# Patient Record
Sex: FEMALE | Race: BLACK OR AFRICAN AMERICAN | NOT HISPANIC OR LATINO | URBAN - METROPOLITAN AREA
[De-identification: names, ages, dates, MRNs, and addresses within clinical notes are randomized per-mention and may not be internally consistent; named-entity substitution may affect disease eponyms.]

---
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
## 2022-01-05 PROCEDURE — 90834 PSYTX W PT 45 MINUTES: CPT | Mod: 95

## 2022-01-12 PROCEDURE — 90832 PSYTX W PT 30 MINUTES: CPT | Mod: 95

## 2022-02-23 PROBLEM — Z00.00 ENCOUNTER FOR PREVENTIVE HEALTH EXAMINATION: Status: ACTIVE | Noted: 2022-02-23

## 2022-03-03 PROCEDURE — 90834 PSYTX W PT 45 MINUTES: CPT | Mod: 95

## 2022-03-07 PROCEDURE — 99214 OFFICE O/P EST MOD 30 MIN: CPT | Mod: 95

## 2022-04-24 ENCOUNTER — TRANSCRIPTION ENCOUNTER (OUTPATIENT)
Age: 22
End: 2022-04-24

## 2022-04-28 PROCEDURE — 99214 OFFICE O/P EST MOD 30 MIN: CPT | Mod: 95

## 2022-07-17 NOTE — ED BEHAVIORAL HEALTH ASSESSMENT NOTE - MARITAL STATUS
Single
Go for blood tests as directed. Your doctor will do lab tests at regular visits to monitor the effects of this medicine. Please follow up with your doctor and keep your health care provider appointments.

## 2022-08-09 PROCEDURE — 99214 OFFICE O/P EST MOD 30 MIN: CPT | Mod: 95

## 2022-10-21 PROCEDURE — 99214 OFFICE O/P EST MOD 30 MIN: CPT | Mod: 95

## 2022-11-02 ENCOUNTER — APPOINTMENT (OUTPATIENT)
Dept: ORTHOPEDIC SURGERY | Facility: CLINIC | Age: 22
End: 2022-11-02

## 2022-11-02 VITALS — WEIGHT: 130 LBS | HEIGHT: 64 IN | BODY MASS INDEX: 22.2 KG/M2

## 2022-11-02 DIAGNOSIS — Z78.9 OTHER SPECIFIED HEALTH STATUS: ICD-10-CM

## 2022-11-02 DIAGNOSIS — M79.18 MYALGIA, OTHER SITE: ICD-10-CM

## 2022-11-02 PROBLEM — F32.9 MAJOR DEPRESSIVE DISORDER, SINGLE EPISODE, UNSPECIFIED: Chronic | Status: ACTIVE | Noted: 2020-05-06

## 2022-11-02 PROCEDURE — 73564 X-RAY EXAM KNEE 4 OR MORE: CPT | Mod: RT

## 2022-11-02 PROCEDURE — 99204 OFFICE O/P NEW MOD 45 MIN: CPT

## 2022-11-02 RX ORDER — NAPROXEN 500 MG/1
500 TABLET ORAL TWICE DAILY
Qty: 60 | Refills: 0 | Status: ACTIVE | COMMUNITY
Start: 2022-11-02 | End: 1900-01-01

## 2022-11-02 NOTE — IMAGING
[de-identified] : RIGHT KNEE\par Inspection:  moderate effusion\par Palpation: med facet of patella tenderness, medial retinacular tenderness\par Knee Range of Motion:  5-120\par Strength: 5/5 Quadriceps strength, 5/5 Hamstring strength\par Neurological: light touch is intact throughout\par Ligament Stability and Special Tests: \par McMurrays: neg\par Lachman: neg\par Pivot Shift: neg\par Posterior Drawer: neg\par Valgus: neg\par Varus: neg\par Patella Apprehension: positive\par Patella Maltracking: positive\par

## 2022-11-02 NOTE — HISTORY OF PRESENT ILLNESS
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

## 2022-11-02 NOTE — ASSESSMENT
[FreeTextEntry1] : Right X-Ray Examination of the KNEE (4 views): there are no fractures, subluxations or dislocations.\par \par Due to the patients instability event and mechanical symptoms along with pain, effusion, and pos patella apprehension and maltracking on exam we will get an mri to eval for loose bodies or chondral defects\par \par - The patient was advised to apply ice (wrapped in a towel or protective covering) to the area daily (20 minutes at a time, 2-4X/day).\par - PT for quad/VMO strengthening and progress to dynamic core and glut exercises\par - The patient was advised to modify their activities.\par - Patella stablization brace \par - naprosyn \par - Patient was given a prescription for an anti-inflammatory medication.  They will take it for the next week and then on an as needed basis, as long as there are no medical contra-indications.  Patient is counseled on possible GI, renal, and cardiovascular side effects.\par - f/u after mri\par \par .\par \par \par

## 2022-11-08 ENCOUNTER — FORM ENCOUNTER (OUTPATIENT)
Age: 22
End: 2022-11-08

## 2022-11-09 ENCOUNTER — APPOINTMENT (OUTPATIENT)
Dept: MRI IMAGING | Facility: CLINIC | Age: 22
End: 2022-11-09

## 2022-11-09 PROCEDURE — 73721 MRI JNT OF LWR EXTRE W/O DYE: CPT | Mod: RT

## 2022-11-18 PROCEDURE — 99214 OFFICE O/P EST MOD 30 MIN: CPT | Mod: 95

## 2022-11-23 ENCOUNTER — APPOINTMENT (OUTPATIENT)
Dept: ORTHOPEDIC SURGERY | Facility: CLINIC | Age: 22
End: 2022-11-23

## 2022-11-23 PROCEDURE — 99214 OFFICE O/P EST MOD 30 MIN: CPT

## 2022-11-23 NOTE — HISTORY OF PRESENT ILLNESS
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

## 2022-11-23 NOTE — ASSESSMENT
[FreeTextEntry1] : mri right knee 11/9/22 - evid lat patella dislocaiton event with BB, synov, eff, mpfl sprain, TT-TG 12mm\par \par - We discussed their diagnosis and treatment options at length including the risks and benefits of both surgical and non-surgical options.\par - We will continue conservative treatment with icing, anti-inflammatory medication, and PT \par - PT for quad/VMO strengthening and progress to dynamic core and glut exercises\par - Patella stablization brace \par - We discussed that RTP can happen when full ROM, no effusion, no pain, full strength (this may take up to 3-4 months after the instability episode)\par - If they do not make an appropriate improvement with conservative treatment we discussed the possibility of surgical intervention in the future.\par - Follow up in 6 weeks to re-evaluate, if not better we discussed r/b/a R knee open MPFLR, LR\par \par \par \par \par

## 2022-11-23 NOTE — IMAGING
[de-identified] : RIGHT KNEE\par Inspection:  moderate effusion\par Palpation: med facet of patella tenderness, medial retinacular tenderness\par Knee Range of Motion:  0-120\par Strength: 5/5 Quadriceps strength, 5/5 Hamstring strength\par Neurological: light touch is intact throughout\par Ligament Stability and Special Tests: \par McMurrays: neg\par Lachman: neg\par Pivot Shift: neg\par Posterior Drawer: neg\par Valgus: neg\par Varus: neg\par Patella Apprehension: positive\par Patella Maltracking: positive\par

## 2022-11-23 NOTE — DISCUSSION/SUMMARY
[de-identified] : The patient was advised of the diagnosis.  The natural history of the pathology was explained in full to the patient in layman's terms.  Several different treatment options were discussed and explained to the patient including the risks and benefits of both surgical and non-surgical treatments.\par \par The risks, benefits, and alternatives to right knee open MPFL reconstruction along with lateral release were reviewed with the patient. \par  \par The risks of surgery were outlined in full to the patient including but not limited to pain, infection (superficial or deep), bleeding, vascular injury, numbness, tingling, nerve damage (direct or indirect), scarring, non-healing, symptomatic implants, wound breakdown, failure to resolve symptoms, symptom recurrence, the need for further surgery, as well as medical complications such as blood clots, pulmonary embolism, heart attack, stroke, and other anesthesia complications including even death.  The patient understood and accepted these risks and that other complications not mentioned above could occur.    \par \par The intraoperative plan, post-operative plan, post-operative expectations and limitations were explained in full.  The importance of postoperative rehabilitation and restriction compliance was emphasized.  Expectations from non-surgical treatment were explained in full as well.  The patient demonstrated a complete understanding of the treatment and alternatives.\par

## 2022-11-29 ENCOUNTER — NON-APPOINTMENT (OUTPATIENT)
Age: 22
End: 2022-11-29

## 2022-11-30 ENCOUNTER — APPOINTMENT (OUTPATIENT)
Dept: ORTHOPEDIC SURGERY | Facility: CLINIC | Age: 22
End: 2022-11-30

## 2022-12-15 NOTE — ED PROVIDER NOTE - INTERNATIONAL TRAVEL
Patient attended Phase 2 Cardiac Rehab Exercise Session. Further documentation will be scanned into the medical record upon discharge.  
No

## 2022-12-29 ENCOUNTER — APPOINTMENT (OUTPATIENT)
Dept: ORTHOPEDIC SURGERY | Facility: CLINIC | Age: 22
End: 2022-12-29

## 2023-01-23 ENCOUNTER — APPOINTMENT (OUTPATIENT)
Dept: ORTHOPEDIC SURGERY | Facility: CLINIC | Age: 23
End: 2023-01-23
Payer: COMMERCIAL

## 2023-01-23 DIAGNOSIS — S83.004A UNSPECIFIED DISLOCATION OF RIGHT PATELLA, INITIAL ENCOUNTER: ICD-10-CM

## 2023-01-23 DIAGNOSIS — M25.361 OTHER INSTABILITY, RIGHT KNEE: ICD-10-CM

## 2023-01-23 DIAGNOSIS — M22.8X1 OTHER DISORDERS OF PATELLA, RIGHT KNEE: ICD-10-CM

## 2023-01-23 PROCEDURE — 99214 OFFICE O/P EST MOD 30 MIN: CPT

## 2023-01-23 RX ORDER — NAPROXEN 500 MG/1
500 TABLET ORAL TWICE DAILY
Qty: 60 | Refills: 0 | Status: ACTIVE | COMMUNITY
Start: 2023-01-23 | End: 1900-01-01

## 2023-01-23 NOTE — ASSESSMENT
[FreeTextEntry1] : mri right knee 11/9/22 - evid lat patella dislocaiton event with BB, synov, eff, mpfl sprain, TT-TG 12mm\par \par - We discussed their diagnosis and treatment options at length including the risks and benefits of both surgical and non-surgical options.\par - PT for quad/VMO strengthening and progress to dynamic core and glut exercises\par - Patella stablization brace with activtiy\par - We discussed that RTP can happen when full ROM, no effusion, no pain, full strength (this may take up to 3-4 months after the instability episode)\par - If they do not make an appropriate improvement with conservative treatment we discussed the possibility of surgical intervention in the future.\par - napryn rx\par - Patient was given a prescription for an anti-inflammatory medication.  They will take it for the next week and then on an as needed basis, as long as there are no medical contra-indications.  Patient is counseled on possible GI, renal, and cardiovascular side effects.\par - Follow up to re-eval and further discuss surgery when can takre off of work,  we discussed r/b/a R knee open MPFLR, LR but right now is not the best time in her life so we will do when she can\par \par \par \par \par \par

## 2023-01-23 NOTE — HISTORY OF PRESENT ILLNESS
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  Feels she needs surgery since patella still subluxes but doesn’t have the time to do it

## 2023-01-23 NOTE — IMAGING
[de-identified] : RIGHT KNEE\par Inspection:  moderate effusion\par Palpation: med facet of patella tenderness, medial retinacular tenderness\par Knee Range of Motion:  0-120\par Strength: 5/5 Quadriceps strength, 5/5 Hamstring strength\par Neurological: light touch is intact throughout\par Ligament Stability and Special Tests: \par McMurrays: neg\par Lachman: neg\par Pivot Shift: neg\par Posterior Drawer: neg\par Valgus: neg\par Varus: neg\par Patella Apprehension: positive\par Patella Maltracking: positive\par

## 2023-01-23 NOTE — DISCUSSION/SUMMARY
[de-identified] : The patient was advised of the diagnosis.  The natural history of the pathology was explained in full to the patient in layman's terms.  Several different treatment options were discussed and explained to the patient including the risks and benefits of both surgical and non-surgical treatments.\par \par The risks, benefits, and alternatives to right knee open MPFL reconstruction along with lateral release were reviewed with the patient. \par  \par The risks of surgery were outlined in full to the patient including but not limited to pain, infection (superficial or deep), bleeding, vascular injury, numbness, tingling, nerve damage (direct or indirect), scarring, non-healing, symptomatic implants, wound breakdown, failure to resolve symptoms, symptom recurrence, the need for further surgery, as well as medical complications such as blood clots, pulmonary embolism, heart attack, stroke, and other anesthesia complications including even death.  The patient understood and accepted these risks and that other complications not mentioned above could occur.    \par \par The intraoperative plan, post-operative plan, post-operative expectations and limitations were explained in full.  The importance of postoperative rehabilitation and restriction compliance was emphasized.  Expectations from non-surgical treatment were explained in full as well.  The patient demonstrated a complete understanding of the treatment and alternatives.\par

## 2023-01-26 ENCOUNTER — NON-APPOINTMENT (OUTPATIENT)
Age: 23
End: 2023-01-26

## 2023-01-27 ENCOUNTER — EMERGENCY (EMERGENCY)
Facility: HOSPITAL | Age: 23
LOS: 0 days | Discharge: HOME | End: 2023-01-27
Attending: EMERGENCY MEDICINE
Payer: COMMERCIAL

## 2023-01-27 VITALS
RESPIRATION RATE: 18 BRPM | OXYGEN SATURATION: 100 % | SYSTOLIC BLOOD PRESSURE: 103 MMHG | DIASTOLIC BLOOD PRESSURE: 66 MMHG | HEART RATE: 83 BPM | TEMPERATURE: 98 F

## 2023-01-27 VITALS
OXYGEN SATURATION: 98 % | HEART RATE: 118 BPM | WEIGHT: 132.06 LBS | RESPIRATION RATE: 18 BRPM | HEIGHT: 64 IN | TEMPERATURE: 98 F | SYSTOLIC BLOOD PRESSURE: 90 MMHG | DIASTOLIC BLOOD PRESSURE: 57 MMHG

## 2023-01-27 DIAGNOSIS — F32.A DEPRESSION, UNSPECIFIED: ICD-10-CM

## 2023-01-27 DIAGNOSIS — F19.19 OTHER PSYCHOACTIVE SUBSTANCE ABUSE WITH UNSPECIFIED PSYCHOACTIVE SUBSTANCE-INDUCED DISORDER: ICD-10-CM

## 2023-01-27 DIAGNOSIS — R00.0 TACHYCARDIA, UNSPECIFIED: ICD-10-CM

## 2023-01-27 PROCEDURE — 93010 ELECTROCARDIOGRAM REPORT: CPT

## 2023-01-27 PROCEDURE — 99284 EMERGENCY DEPT VISIT MOD MDM: CPT

## 2023-01-27 NOTE — ED PROVIDER NOTE - PHYSICAL EXAMINATION
Clinic Care Coordination Contact  [facility]/Voicemail       Clinical Data: Care Coordinator Outreach  Outreach attempted x 1.  Unable to reach.    Plan: Care Coordinator will try to reach patient again in 1-2 business days.        KHALIF Machuca  [phone]  [facility]     Gen: Alert, NAD  Head: NC, AT   Eyes: PERRL, EOMI, normal lids/conjunctiva  ENT: normal hearing, patent oropharynx without erythema/exudate, uvula midline  Neck: supple, no tenderness, Trachea midline  Pulm: Bilateral BS, normal resp effort, no wheeze/stridor/retractions  CV: RRR, no M/R/G, 2+ radial and dp pulses bl, no edema  Abd: soft, NT/ND, +BS, no hepatosplenomegaly  Mskel: extremities x4 with normal ROM and no joint effusions. no ctl spine ttp.   Skin: no rash, no bruising   Neuro: AAOx3, no sensory/motor deficits, CN 2-12 intact

## 2023-01-27 NOTE — ED ADULT NURSE NOTE - MODE OF DISCHARGE
Transitional Planning:    ZAYNAB FAUSTIN for progress note and pink slip stating pt is medically stable for [facility] transport. CM had spoke with female resident earlier re: this but there is nothing in the chart. 14:35  CM called [facility] access re: bed availability. They cannot start process until we have a neg COVID test.  Results not back yet. 16:25  [facility] with Alyse De La Cruz. COIVD is neg. Alyse De La Cruz states that pt has been accepted and once they get the voluntary form faxed they can let us know when bed is available. Dayanara Ibrahim RN faxed form. CM called [facility] and sent over paper work. Pt is will call for transport. Transport info faxed to [facility].    18:20 Transport set for [facility] as pt is cleared for [facility]. Ambulatory

## 2023-01-27 NOTE — ED PROVIDER NOTE - NSFOLLOWUPINSTRUCTIONS_ED_ALL_ED_FT
Call the neurology doctor for a follow up appointment.     Avoid using marijuana in any form.     Use trazodone carefully and only as needed.

## 2023-01-27 NOTE — ED PROVIDER NOTE - CARE PROVIDER_API CALL
Heron Samaniego)  Clinical Neurophysiology; Neurology  [address], [address]  [place] [address]  Phone: [phone]  Fax: [phone]  Follow Up Time: 4-6 Days    Taylor Hidalgo  NEUROLOGY  [address] [place] [address]  Phone: [phone]  Fax: [phone]  Follow Up Time: 4-6 Days   Heron Samaniego)  Clinical Neurophysiology; Neurology  [address], [address]  [place] [address]  Phone: [phone]  Fax: [phone]  Follow Up Time: 4-6 Days    Taylor Hidalgo  NEUROLOGY  [address] [place] [address]  Phone: [phone]  Fax: [phone]  Follow Up Time: 4-6 Days   Heron Samaniego)  Clinical Neurophysiology; Neurology  [address], [address]  [place] [address]  Phone: [phone]  Fax: [phone]  Follow Up Time: 4-6 Days    Taylor Hidalgo  NEUROLOGY  [address] [place] [address]  Phone: [phone]  Fax: [phone]  Follow Up Time: 4-6 Days

## 2023-01-27 NOTE — ED ADULT TRIAGE NOTE - CHIEF COMPLAINT QUOTE
pt states, she smoked marijuana today, after smoking she vomited and became confused." at triage pt is AOx4. pt c/o dizziness and lightheaded. pt states, she smoked marijuana today, after smoking she vomited and became confused." at triage pt is AOx4. pt c/o dizziness and lightheaded. bp is 90/57, .

## 2023-01-27 NOTE — ED PROVIDER NOTE - NPI NUMBER (FOR SYSADMIN USE ONLY) :
Patient had  19.   Should patient make an appt with PCP for this issue?  Sent to Dr. Nielsen     [[id number]],[[id number]] [[id number]] [[id number]],[[id number]]

## 2023-01-27 NOTE — ED PROVIDER NOTE - PATIENT PORTAL LINK FT
You can access the FollowMyHealth Patient Portal offered by [facility] by registering at the following website: [email or web address]. By joining [employer]’s FollowMyHealth portal, you will also be able to view your health information using other applications (apps) compatible with our system. You can access the FollowMyHealth Patient Portal offered by [facility] by registering at the following website: [email or web address]. By joining [employer]’s FollowMyHealth portal, you will also be able to view your health information using other applications (apps) compatible with our system. You can access the FollowMyHealth Patient Portal offered by [facility] by registering at the following website: [email or web address]. By joining [employer]’s FollowMyHealth portal, you will also be able to view your health information using other applications (apps) compatible with our system.

## 2023-01-27 NOTE — ED ADULT NURSE NOTE - NSIMPLEMENTINTERV_GEN_ALL_ED
Implemented All Universal Safety Interventions:  [place] to call system. Call bell, personal items and telephone within reach. Instruct patient to call for assistance. Room bathroom lighting operational. Non-slip footwear when patient is off stretcher. Physically safe environment: no spills, clutter or unnecessary equipment. Stretcher in lowest position, wheels locked, appropriate side rails in place. Implemented All Universal Safety Interventions:  [place] to call system. Call bell, personal items and telephone within reach. Instruct patient to call for assistance. Room bathroom lighting operational. Non-slip footwear when patient is off stretcher. Physically safe environment: no spills, clutter or unnecessary equipment. Stretcher in lowest position, wheels locked, appropriate side rails in place. Implemented All Universal Safety Interventions:  [place] to call system. Call bell, personal items and telephone within reach. Instruct patient to call for assistance. Room bathroom lighting operational. Non-slip footwear when patient is off stretcher. Physically safe environment: no spills, clutter or unnecessary equipment. Stretcher in lowest position, wheels locked, appropriate side rails in place.

## 2023-01-27 NOTE — ED PROVIDER NOTE - CLINICAL SUMMARY MEDICAL DECISION MAKING FREE TEXT BOX
thc use, likely contributing to "black out" episode, which sounds more like thc intoxication or over sedation rather than syncope. pt counseled to avoid using thc and to take trazodone carefully. pt should see neuro to rule out epileptiform activity    I read ekg as sinus tach rate 112, no st elevation or depression, qtc 444, narrow qrs, normal axis.

## 2023-01-27 NOTE — ED PROVIDER NOTE - PROVIDER TOKENS
PROVIDER:[TOKEN:[[id number]],FOLLOWUP:[4-6 Days]],PROVIDER:[TOKEN:[[id number]],FOLLOWUP:[4-6 Days]] PROVIDER:[TOKEN:[[id number]],FOLLOWUP:[4-6 Days]],PROVIDER:[TOKEN:[[id number]],FOLLOWUP:[4-6 Days]] PROVIDER:[TOKEN:[[id number]],FOLLOWUP:[4-6 Days]],PROVIDER:[TOKEN:[[id number]],FOLLOWUP:[4-6 Days]]

## 2023-01-27 NOTE — ED PROVIDER NOTE - CARE PROVIDERS DIRECT ADDRESSES
,[email or web address],[email or web address] ,[email or web address],DirectAddress_Unknown ,[email or web address],DirectAddress_Unknown

## 2023-01-27 NOTE — ED ADULT NURSE NOTE - CHIEF COMPLAINT QUOTE
pt states, she smoked marijuana today, after smoking she vomited and became confused." at triage pt is AOx4. pt c/o dizziness and lightheaded. bp is 90/57, .

## 2023-01-27 NOTE — ED ADULT NURSE NOTE - OBJECTIVE STATEMENT
Covering for primary RN Pratibha.   Received pt in the ED from triage. AOx4. C/o dizziness, lightheaded. Pt endorsed she "blacked out" at a friend's house after smoking marijuana, also she felt she could not move her arms and legs. Pt endorsed she had similar episode in the past. Pt endorsed the symptoms sometimes correspondents smoking or trazodone taking.  Pt denied any fall/trauma/injury, cp, sob, n/v/d, SI/HI, hallucinations. Speaks full sentences, unlabored breathing on RA. Able to TORRES. PMH of depression. Home meds on lexapro and trazodone .

## 2024-02-02 ENCOUNTER — NON-APPOINTMENT (OUTPATIENT)
Age: 24
End: 2024-02-02

## 2024-07-31 PROBLEM — F32.9 MAJOR DEPRESSIVE DISORDER, SINGLE EPISODE, UNSPECIFIED: Chronic | Status: ACTIVE | Noted: 2023-01-27

## 2024-08-05 ENCOUNTER — APPOINTMENT (OUTPATIENT)
Dept: ORTHOPEDIC SURGERY | Facility: CLINIC | Age: 24
End: 2024-08-05

## 2024-08-05 PROCEDURE — 99214 OFFICE O/P EST MOD 30 MIN: CPT

## 2024-08-05 PROCEDURE — 73564 X-RAY EXAM KNEE 4 OR MORE: CPT | Mod: RT

## 2024-08-05 NOTE — DISCUSSION/SUMMARY
[de-identified] : The patient was advised of the diagnosis.  The natural history of the pathology was explained in full to the patient in layman's terms.  Several different treatment options were discussed and explained to the patient including the risks and benefits of both surgical and non-surgical treatments.  The risks, benefits, and alternatives to right knee open MPFL reconstruction along with VMO advancement and lateral release were reviewed with the patient.    The risks of surgery were outlined in full to the patient including but not limited to pain, infection (superficial or deep), bleeding, vascular injury, numbness, tingling, nerve damage (direct or indirect), scarring, non-healing, symptomatic implants, wound breakdown, failure to resolve symptoms, symptom recurrence, the need for further surgery, as well as medical complications such as blood clots, pulmonary embolism, heart attack, stroke, and other anesthesia complications including even death.  The patient understood and accepted these risks and that other complications not mentioned above could occur.      The intraoperative plan, post-operative plan, post-operative expectations and limitations were explained in full.  The importance of postoperative rehabilitation and restriction compliance was emphasized.  Expectations from non-surgical treatment were explained in full as well.  The patient demonstrated a complete understanding of the treatment and alternatives.

## 2024-08-05 NOTE — HISTORY OF PRESENT ILLNESS
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 Has tried ice, naproxen  Had prior instability event in past tx nonop PT/brace  11/23/22 - had mri showing patella dilocaiton event, no LB, using ice, brace and mod activiyt, cont sense of instability 1/23/23- Has been doing HEP and PT in [place]  without relief.  Feels she needs surgery since patella still subluxes but doesn't have the time to do it  8/5/24- felt patella sublux again mid july and worsening since, has a couple more instability events since jan but not as bad as this one

## 2024-08-05 NOTE — ASSESSMENT
[FreeTextEntry1] : mri right knee 11/9/22 - evid lat patella dislocaiton event with BB, synov, eff, mpfl sprain, TT-TG 12mm    - The patient was advised of the diagnosis.  The natural history of the pathology was explained to the patient in layman's terms.  Several different treatment options were discussed and explained including the risks and benefits of both surgical and non-surgical treatments.   Surgical risks include but are not limited to pain, infection, bleeding, vascular injury, numbness, tingling, nerve damage. - since not improving with conserv tx we discussed she is canddiate for surgery  - r/b/a R knee open MPFLR, VMO advancement, LR discused at length -  right now is not the best time in her life so we will do when she can - cont PT for quad/VMO strengthening and progress to dynamic core and glut exercises - The patient is to continue home exercises learned at physical therapy. - mri to eval for LB since new dislocation event - Follow up to re-eval and further discuss surgery if she can take off work , she says its hard and takes care of her grandma is why she hasnt had it yet

## 2024-08-05 NOTE — IMAGING
[de-identified] :  RIGHT KNEE Inspection:  moderate effusion Palpation: med facet of patella tenderness, medial retinacular tenderness Knee Range of Motion:  0-130 Strength: 5/5 Quadriceps strength, 5/5 Hamstring strength Neurological: light touch is intact throughout Ligament Stability and Special Tests:  McMurrays: neg Lachman: neg Pivot Shift: neg Posterior Drawer: neg Valgus: neg Varus: neg Patella Apprehension: positive Patella Maltracking: positive

## 2024-08-08 ENCOUNTER — APPOINTMENT (OUTPATIENT)
Dept: MRI IMAGING | Facility: CLINIC | Age: 24
End: 2024-08-08

## 2024-08-08 PROCEDURE — 73721 MRI JNT OF LWR EXTRE W/O DYE: CPT | Mod: RT

## 2024-08-14 NOTE — DISCUSSION/SUMMARY
[de-identified] : The patient was advised of the diagnosis.  The natural history of the pathology was explained in full to the patient in layman's terms.  Several different treatment options were discussed and explained to the patient including the risks and benefits of both surgical and non-surgical treatments.  The risks, benefits, and alternatives to right knee open MPFL reconstruction along with VMO advancement and lateral release were reviewed with the patient.    The risks of surgery were outlined in full to the patient including but not limited to pain, infection (superficial or deep), bleeding, vascular injury, numbness, tingling, nerve damage (direct or indirect), scarring, non-healing, symptomatic implants, wound breakdown, failure to resolve symptoms, symptom recurrence, the need for further surgery, as well as medical complications such as blood clots, pulmonary embolism, heart attack, stroke, and other anesthesia complications including even death.  The patient understood and accepted these risks and that other complications not mentioned above could occur.      The intraoperative plan, post-operative plan, post-operative expectations and limitations were explained in full.  The importance of postoperative rehabilitation and restriction compliance was emphasized.  Expectations from non-surgical treatment were explained in full as well.  The patient demonstrated a complete understanding of the treatment and alternatives.

## 2024-08-14 NOTE — IMAGING
[de-identified] :  RIGHT KNEE Inspection:  moderate effusion Palpation: med facet of patella tenderness, medial retinacular tenderness Knee Range of Motion:  0-130 Strength: 5/5 Quadriceps strength, 5/5 Hamstring strength Neurological: light touch is intact throughout Ligament Stability and Special Tests:  McMurrays: neg Lachman: neg Pivot Shift: neg Posterior Drawer: neg Valgus: neg Varus: neg Patella Apprehension: positive Patella Maltracking: positive

## 2024-08-14 NOTE — HISTORY OF PRESENT ILLNESS
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 Has tried ice, naproxen  Had prior instability event in past tx nonop PT/brace  11/23/22 - had mri showing patella dilocaiton event, no LB, using ice, brace and mod activiyt, cont sense of instability 1/23/23- Has been doing HEP and PT in [place]  without relief.  Feels she needs surgery since patella still subluxes but doesn't have the time to do it  8/5/24- felt patella sublux again mid july and worsening since, has a couple more instability events since jan but not as bad as this one 8/15/24 - cont sense of instabilty, had updated mri cont with HEP learned at PT and icing and nsaids

## 2024-08-14 NOTE — ED ADULT NURSE NOTE - CHIEF COMPLAINT QUOTE
Pt arrives with staff from [facility] c/o right knee pain after doing lunges. Also c/o difficulty ambulating. Appears in no apparent distress. MD Osullivan for peds eval. Pt cleared for peds per MD Osullivan. Well I guess this is relatively good news.    If she was taking 1 pill Colestipol twice daily, she should start again 1 pill/day or maybe 1 pill every other day and watch closely for how she does.  She may only need to take 3-5 pills/week if she is that sensitive.    Glad that she took some MiraLAX/Dulcolax.    If she is willing, I would recommend continuing on the Metamucil psyllium indefinitely.  Probably can stop the stool softeners once the constipation lets up.    - cb

## 2024-08-14 NOTE — ASSESSMENT
[FreeTextEntry1] : mri right knee 11/9/22 - evid lat patella dislocaiton event with BB, synov, eff, mpfl sprain, TT-TG 12mm mri right knee 8/8/24 - lat patella tilt, mpfl sprain, eff, synov, TT-TG 13mm    - The patient was advised of the diagnosis.  The natural history of the pathology was explained to the patient in layman's terms.  Several different treatment options were discussed and explained including the risks and benefits of both surgical and non-surgical treatments.   Surgical risks include but are not limited to pain, infection, bleeding, vascular injury, numbness, tingling, nerve damage. - since not improving with conserv tx we discussed she is canddiate for surgery  - r/b/a R knee open MPFLR, VMO advancement, LR discused at length -  right now is not the best time in her life so we will do when she can - cont PT for quad/VMO strengthening and progress to dynamic core and glut exercises - The patient is to continue home exercises learned at physical therapy. - mri to eval for LB since new dislocation event - Follow up to re-eval and further discuss surgery if she can take off work , she says its hard and takes care of her grandma is why she hasnt had it yet

## 2024-08-15 ENCOUNTER — APPOINTMENT (OUTPATIENT)
Dept: ORTHOPEDIC SURGERY | Facility: CLINIC | Age: 24
End: 2024-08-15

## 2024-08-15 DIAGNOSIS — M22.8X1 OTHER DISORDERS OF PATELLA, RIGHT KNEE: ICD-10-CM

## 2024-08-15 DIAGNOSIS — M25.361 OTHER INSTABILITY, RIGHT KNEE: ICD-10-CM

## 2024-08-15 DIAGNOSIS — S83.004A UNSPECIFIED DISLOCATION OF RIGHT PATELLA, INITIAL ENCOUNTER: ICD-10-CM

## 2024-08-26 NOTE — ED PEDIATRIC NURSE NOTE - CAS EDN INTEG ASSESS
In an effort to ensure that our patients [employer], a Team Member has reviewed your chart and identified an opportunity to provide the best care possible. An attempt was made to discuss or schedule due or overdue Preventive or Chronic Condition care.    The Outcome was Contact was not made, left message. We are attempting to schedule a mammogram appointment. If you have any questions or need help with scheduling, contact our Health Outreach Team at [phone]. Care Gaps identified: Breast Cancer Screening.    Appointment needed:  Mammo    Services coordinated include: None.   - - -

## 2024-11-06 ENCOUNTER — EMERGENCY (EMERGENCY)
Facility: HOSPITAL | Age: 24
LOS: 1 days | Discharge: HOME | End: 2024-11-06
Attending: STUDENT IN AN ORGANIZED HEALTH CARE EDUCATION/TRAINING PROGRAM
Payer: COMMERCIAL

## 2024-11-06 VITALS
WEIGHT: 139.99 LBS | DIASTOLIC BLOOD PRESSURE: 80 MMHG | OXYGEN SATURATION: 97 % | HEART RATE: 97 BPM | RESPIRATION RATE: 19 BRPM | SYSTOLIC BLOOD PRESSURE: 125 MMHG | TEMPERATURE: 98 F | HEIGHT: 64 IN

## 2024-11-06 VITALS
TEMPERATURE: 99 F | OXYGEN SATURATION: 100 % | DIASTOLIC BLOOD PRESSURE: 74 MMHG | SYSTOLIC BLOOD PRESSURE: 115 MMHG | HEART RATE: 78 BPM | RESPIRATION RATE: 16 BRPM

## 2024-11-06 LAB
ALBUMIN SERPL ELPH-MCNC: 4.4 G/DL — SIGNIFICANT CHANGE UP (ref 3.3–5)
ALP SERPL-CCNC: 58 U/L — SIGNIFICANT CHANGE UP (ref 40–120)
ALT FLD-CCNC: 9 U/L — LOW (ref 10–45)
ANION GAP SERPL CALC-SCNC: 10 MMOL/L — SIGNIFICANT CHANGE UP (ref 5–17)
AST SERPL-CCNC: 15 U/L — SIGNIFICANT CHANGE UP (ref 10–40)
BASOPHILS # BLD AUTO: 0.06 K/UL — SIGNIFICANT CHANGE UP (ref 0–0.2)
BASOPHILS NFR BLD AUTO: 0.6 % — SIGNIFICANT CHANGE UP (ref 0–2)
BILIRUB SERPL-MCNC: 0.5 MG/DL — SIGNIFICANT CHANGE UP (ref 0.2–1.2)
BUN SERPL-MCNC: 17 MG/DL — SIGNIFICANT CHANGE UP (ref 7–23)
CALCIUM SERPL-MCNC: 9.5 MG/DL — SIGNIFICANT CHANGE UP (ref 8.4–10.5)
CHLORIDE SERPL-SCNC: 105 MMOL/L — SIGNIFICANT CHANGE UP (ref 96–108)
CO2 SERPL-SCNC: 23 MMOL/L — SIGNIFICANT CHANGE UP (ref 22–31)
CREAT SERPL-MCNC: 0.81 MG/DL — SIGNIFICANT CHANGE UP (ref 0.5–1.3)
EGFR: 106 ML/MIN/1.73M2 — SIGNIFICANT CHANGE UP
EOSINOPHIL # BLD AUTO: 0.06 K/UL — SIGNIFICANT CHANGE UP (ref 0–0.5)
EOSINOPHIL NFR BLD AUTO: 0.6 % — SIGNIFICANT CHANGE UP (ref 0–6)
GLUCOSE SERPL-MCNC: 92 MG/DL — SIGNIFICANT CHANGE UP (ref 70–99)
HCG SERPL-ACNC: <2 MIU/ML — SIGNIFICANT CHANGE UP
HCT VFR BLD CALC: 35.5 % — SIGNIFICANT CHANGE UP (ref 34.5–45)
HGB BLD-MCNC: 12.4 G/DL — SIGNIFICANT CHANGE UP (ref 11.5–15.5)
IMM GRANULOCYTES NFR BLD AUTO: 0.3 % — SIGNIFICANT CHANGE UP (ref 0–0.9)
LIDOCAIN IGE QN: 37 U/L — SIGNIFICANT CHANGE UP (ref 7–60)
LYMPHOCYTES # BLD AUTO: 2.78 K/UL — SIGNIFICANT CHANGE UP (ref 1–3.3)
LYMPHOCYTES # BLD AUTO: 28.3 % — SIGNIFICANT CHANGE UP (ref 13–44)
MAGNESIUM SERPL-MCNC: 2 MG/DL — SIGNIFICANT CHANGE UP (ref 1.6–2.6)
MCHC RBC-ENTMCNC: 29.7 PG — SIGNIFICANT CHANGE UP (ref 27–34)
MCHC RBC-ENTMCNC: 34.9 G/DL — SIGNIFICANT CHANGE UP (ref 32–36)
MCV RBC AUTO: 84.9 FL — SIGNIFICANT CHANGE UP (ref 80–100)
MONOCYTES # BLD AUTO: 0.7 K/UL — SIGNIFICANT CHANGE UP (ref 0–0.9)
MONOCYTES NFR BLD AUTO: 7.1 % — SIGNIFICANT CHANGE UP (ref 2–14)
NEUTROPHILS # BLD AUTO: 6.2 K/UL — SIGNIFICANT CHANGE UP (ref 1.8–7.4)
NEUTROPHILS NFR BLD AUTO: 63.1 % — SIGNIFICANT CHANGE UP (ref 43–77)
NRBC # BLD: 0 /100 WBCS — SIGNIFICANT CHANGE UP (ref 0–0)
PLATELET # BLD AUTO: 410 K/UL — HIGH (ref 150–400)
POTASSIUM SERPL-MCNC: 4.1 MMOL/L — SIGNIFICANT CHANGE UP (ref 3.5–5.3)
POTASSIUM SERPL-SCNC: 4.1 MMOL/L — SIGNIFICANT CHANGE UP (ref 3.5–5.3)
PROT SERPL-MCNC: 7.4 G/DL — SIGNIFICANT CHANGE UP (ref 6–8.3)
RBC # BLD: 4.18 M/UL — SIGNIFICANT CHANGE UP (ref 3.8–5.2)
RBC # FLD: 14 % — SIGNIFICANT CHANGE UP (ref 10.3–14.5)
SODIUM SERPL-SCNC: 138 MMOL/L — SIGNIFICANT CHANGE UP (ref 135–145)
TROPONIN T, HIGH SENSITIVITY RESULT: <6 NG/L — SIGNIFICANT CHANGE UP (ref 0–51)
WBC # BLD: 9.83 K/UL — SIGNIFICANT CHANGE UP (ref 3.8–10.5)
WBC # FLD AUTO: 9.83 K/UL — SIGNIFICANT CHANGE UP (ref 3.8–10.5)

## 2024-11-06 PROCEDURE — 71046 X-RAY EXAM CHEST 2 VIEWS: CPT

## 2024-11-06 PROCEDURE — 93005 ELECTROCARDIOGRAM TRACING: CPT

## 2024-11-06 PROCEDURE — 99285 EMERGENCY DEPT VISIT HI MDM: CPT | Mod: 25

## 2024-11-06 PROCEDURE — 83690 ASSAY OF LIPASE: CPT

## 2024-11-06 PROCEDURE — 71046 X-RAY EXAM CHEST 2 VIEWS: CPT | Mod: 26

## 2024-11-06 PROCEDURE — 80053 COMPREHEN METABOLIC PANEL: CPT

## 2024-11-06 PROCEDURE — 99285 EMERGENCY DEPT VISIT HI MDM: CPT

## 2024-11-06 PROCEDURE — 84484 ASSAY OF TROPONIN QUANT: CPT

## 2024-11-06 PROCEDURE — 83735 ASSAY OF MAGNESIUM: CPT

## 2024-11-06 PROCEDURE — 85025 COMPLETE CBC W/AUTO DIFF WBC: CPT

## 2024-11-06 PROCEDURE — 84702 CHORIONIC GONADOTROPIN TEST: CPT

## 2024-11-06 PROCEDURE — 36000 PLACE NEEDLE IN VEIN: CPT

## 2024-11-06 RX ORDER — SODIUM CHLORIDE 9 MG/ML
1000 INJECTION, SOLUTION INTRAMUSCULAR; INTRAVENOUS; SUBCUTANEOUS ONCE
Refills: 0 | Status: COMPLETED | OUTPATIENT
Start: 2024-11-06 | End: 2024-11-06

## 2024-11-06 RX ADMIN — SODIUM CHLORIDE 2000 MILLILITER(S): 9 INJECTION, SOLUTION INTRAMUSCULAR; INTRAVENOUS; SUBCUTANEOUS at 13:50

## 2024-11-06 NOTE — ED ADULT NURSE NOTE - MODE OF DISCHARGE
Albendazole Pregnancy And Lactation Text: This medication is Pregnancy Category C and it isn't known if it is safe during pregnancy. It is also excreted in breast milk. Ambulatory

## 2024-11-06 NOTE — ED PROVIDER NOTE - CLINICAL SUMMARY MEDICAL DECISION MAKING FREE TEXT BOX
Attending Fareed: 22 y/o F who denies PMH presenting w/ chest pain. Reports yesterday while at work had episode of pain in her upper stomach radiating into chest. This caused her to feel dizzy, felt like her vision was going black so had to sit down. While sitting down, felt like she was paralyzed. Was aware of her surroundings, but could not move or speak. Episode was brief and self resolved. Had another episode later in the day. No loss of urinary continence. No tongue biting. No shaking episode. Denies prior hx of similar. No further episodes since. No known family hx of sudden cardiac death or cardiac disease at young age. Non smoker. Denies fevers, chills, headache, blurred vision,  cough, shortness of breath, n/v/d/c, urinary symptoms, MSK pain, rash. Pt overall well appearing. Description of episode sounds most consistent w/ vaso vagal episode. No other symptoms to suggest seizure. Likely brought on by episode of pain. Will eval for ACS. Eval for pancreatitis. No hypoxia/tachypnea/tachycardia to suggest PE. Will eval for anemia. Eval for arrhythmia. No family hx of sudden cardiac death. Eval for metabolic abnormalities. Plan for labs, imaging, EKG, IVF, meds PRN. Will reassess the need for additional interventions as clinically warranted. Refer to any progress notes for updates on clinical course and as a continuation of this MDM.     I, Dr. Nikko Guerrier, independently interpreted the EKG which showed NSR at a rate of 86.

## 2024-11-06 NOTE — ED PROVIDER NOTE - NSFOLLOWUPINSTRUCTIONS_ED_ALL_ED_FT
Syncope    Syncope is when you temporarily lose consciousness, also called fainting or passing out. It is caused by a sudden decrease in blood flow to the brain. Even though most causes of syncope are not dangerous, syncope can possibly be a sign of a serious medical problem. Signs that you may be about to faint include feeling dizzy, lightheaded, nausea, visual changes, or cold/clammy skin. Do not drive, operate heavy machinery, or play sports until your health care provider says it is okay.    Followup with the above cardiologist for a holter monitor and re-assessment.    Followup with the above neurologist for potential complex migraines    SEEK IMMEDIATE MEDICAL CARE IF YOU HAVE ANY OF THE FOLLOWING SYMPTOMS: severe headache, pain in your chest/abdomen/back, bleeding from your mouth or rectum, palpitations, shortness of breath, pain with breathing, seizure, confusion, or trouble walking.

## 2024-11-06 NOTE — ED PROVIDER NOTE - OBJECTIVE STATEMENT
Attending Fareed: 22 y/o F who denies PMH presenting w/ Attending Fareed: 22 y/o F who denies PMH presenting w/ chest pain. Reports yesterday while at work had episode of pain in her upper stomach radiating into chest. This caused her to feel dizzy, felt like her vision was going black so had to sit down. While sitting down, felt like she was paralyzed. Was aware of her surroundings, but could not move or speak. Episode was brief and self resolved. Had another episode later in the day. No loss of urinary continence. No tongue biting. No shaking episode. Denies prior hx of similar. No further episodes since. No known family hx of sudden cardiac death or cardiac disease at young age. Non smoker. Denies fevers, chills, headache, blurred vision,  cough, shortness of breath, n/v/d/c, urinary symptoms, MSK pain, rash.

## 2024-11-06 NOTE — ED PROVIDER NOTE - ATTENDING CONTRIBUTION TO CARE
Attending Fareed: I performed a history and physical exam of the patient and discussed their management with the resident/fellow/student. I have reviewed the resident/fellow/student note and agree with the documented findings and plan of care, except as noted. I have personally performed a substantive portion of the visit including all aspects of the medical decision making. My medical decision making and observations are found above. Please refer to any progress notes for updates on clinical course. My notes supersedes the above resident/fellow/student note in case of discrepancy

## 2024-11-06 NOTE — ED PROVIDER NOTE - PATIENT PORTAL LINK FT
You can access the FollowMyHealth Patient Portal offered by [facility] by registering at the following website: [email or web address]. By joining [facility]’s FollowMyHealth portal, you will also be able to view your health information using other applications (apps) compatible with our system.

## 2024-11-06 NOTE — ED ADULT NURSE NOTE - NSFALLHARMRISKINTERV_ED_ALL_ED

## 2024-11-06 NOTE — ED PROVIDER NOTE - PROGRESS NOTE DETAILS
Edouard Morrow MD:  Patient symptomatically improved, discuss followup with cardiology and neurology

## 2024-11-06 NOTE — ED ADULT NURSE NOTE - OBJECTIVE STATEMENT
Pt states "while at work about 12pm today I became a little lightheaded and some pain in the middle of my chest with tingling down legs and feeling like I was going to pass out. I sat myself down but did not pas out. Pain to chest/back comes and goes, none right now. No sob/n/v"

## 2024-11-08 PROBLEM — Z86.59 PERSONAL HISTORY OF OTHER MENTAL AND BEHAVIORAL DISORDERS: Chronic | Status: ACTIVE | Noted: 2024-11-06

## 2024-11-11 ENCOUNTER — APPOINTMENT (OUTPATIENT)
Dept: CARDIOLOGY | Facility: CLINIC | Age: 24
End: 2024-11-11

## 2025-05-04 ENCOUNTER — NON-APPOINTMENT (OUTPATIENT)
Age: 25
End: 2025-05-04

## 2025-05-23 ENCOUNTER — NON-APPOINTMENT (OUTPATIENT)
Age: 25
End: 2025-05-23